# Patient Record
Sex: MALE | Race: WHITE | Employment: STUDENT | URBAN - METROPOLITAN AREA
[De-identification: names, ages, dates, MRNs, and addresses within clinical notes are randomized per-mention and may not be internally consistent; named-entity substitution may affect disease eponyms.]

---

## 2017-09-01 ENCOUNTER — OFFICE VISIT (OUTPATIENT)
Dept: FAMILY MEDICINE | Facility: CLINIC | Age: 27
End: 2017-09-01
Payer: COMMERCIAL

## 2017-09-01 ENCOUNTER — TRANSFERRED RECORDS (OUTPATIENT)
Dept: HEALTH INFORMATION MANAGEMENT | Facility: CLINIC | Age: 27
End: 2017-09-01

## 2017-09-01 VITALS
BODY MASS INDEX: 23.57 KG/M2 | RESPIRATION RATE: 22 BRPM | WEIGHT: 174 LBS | SYSTOLIC BLOOD PRESSURE: 120 MMHG | HEIGHT: 72 IN | DIASTOLIC BLOOD PRESSURE: 70 MMHG | OXYGEN SATURATION: 99 % | TEMPERATURE: 96.5 F | HEART RATE: 82 BPM

## 2017-09-01 DIAGNOSIS — N50.819 TESTICLE PAIN: ICD-10-CM

## 2017-09-01 DIAGNOSIS — Z00.00 ROUTINE GENERAL MEDICAL EXAMINATION AT A HEALTH CARE FACILITY: Primary | ICD-10-CM

## 2017-09-01 DIAGNOSIS — J30.1 ACUTE SEASONAL ALLERGIC RHINITIS DUE TO POLLEN: ICD-10-CM

## 2017-09-01 DIAGNOSIS — S59.901A ELBOW INJURY, RIGHT, INITIAL ENCOUNTER: ICD-10-CM

## 2017-09-01 PROCEDURE — 99395 PREV VISIT EST AGE 18-39: CPT | Performed by: FAMILY MEDICINE

## 2017-09-01 PROCEDURE — 99214 OFFICE O/P EST MOD 30 MIN: CPT | Mod: 25 | Performed by: FAMILY MEDICINE

## 2017-09-01 NOTE — MR AVS SNAPSHOT
After Visit Summary   9/1/2017    Bereket Croft    MRN: [id number]           Patient Information     Date Of Birth          1990        Visit Information        Provider Department      9/1/2017 8:00 AM Martha Wyman MD [facility]        Today's Diagnoses     Routine general medical examination at a health care facility    -  1    Testicle pain- Lt late 8-17        Acute seasonal allergic rhinitis due to pollen          Care Instructions    1. Please do your breast exam every mo, when you  Change the  calendar page or set an alarm on your cell phone Do a  visual check for dimples, inversion or indentation or any different position of the nipple Feel manually  for any 1cm or larger  size mass ie about the size of an almond Be sure to cover the entire area of both breasts : this extends back to the back on either side and from the collar bone to the bottom of the breasts where you can begin to feel ribs.  And check the testicles for cancer also then     2. Boil water and breath the warm vapors 2-3 times a day to try to open up the sinuses. Run a steamer or cold air vaporizer as much as possible. Take Mucinex plain blue  1200 mg (This may come as 600mg/tablet and you need to take 2 tabs twice a day) twice a day. Mucinex is guaifenesin, the major component of most cough syrups, because it makes the mucus less thick, and therefore it drains out better and you are less likely to cough from it dripping on the back of your throat.  Irrigate the  nose with plain water under the kitchen sink faucet or the shower.  Luma pots, spray bottles, etc accumulate bacteria and are not recommended.   The tickle in the throat is also helped by gargling with vinegar and honey mixture, or pop or mouth wash as these coat the throat.  Please try to rinse teeth with water after using these .     3.  Consider antihistamines= antiallergens:fro least to most powerful and  from least to most drowsy :   Loratadine, clariten, alavert        Loratadine, clariten, alavert 10mgm a day        Fexofenadine= allegra 180mgm a day                                                                                                                                                       Cetirizine=zyrtec  10mgm a day###        Benadryl=diphenhydramine  25-50 mgm- only at bedtime-can be used together with one of the above taken during the day    4. If still trouble, go to [facility]     5. Do the testicle US  At [facility]   [phone]  At  [address] between [facility] & [facility]    6. No difference was  Noted by patients in a double blind study when given codeine, tylenol ( acetaminophen) or ibuprofen (all in identical pills). They felt no difference in pain relief. Since ibuprofen and the NSAIDs  causes kidney damage, esophageal damage with heartburn, and can increase the risk of esophageal and stomach cancer and ulcers,and colonic strictures. They also cause increased risk of heart attack .   I recommend that you use tylenol(acetaminophen) for pain. Use the acetaminophen ES  Which has 500mgm/tablet You can take up to 2 tablets 4 times a day as need for pain.  If this is not enough, you can add in ibuprofen or aleve(naprosyn) with 2 glasses of fluid and some food-to protect the stomach and esophagus. Please let us know if you are continuing to take ibuprofen or aleve, as we will need to periodically check your kidney function with a blood test.            Follow-ups after your visit        Future tests that were ordered for you today     Open Future Orders        Priority Expected Expires Ordered    US Testicular & Scrotum w Doppler Ltd Routine  9/1/2018 9/1/2017            Who to contact     If you have questions or need follow up information about today's clinic visit or your schedule please contact [facility] directly at  "[phone].  Normal or non-critical lab and imaging results will be communicated to you by MyChart, letter or phone within 4 business days after the clinic has received the results. If you do not hear from us within 7 days, please contact the clinic through [employer] or phone. If you have a critical or abnormal lab result, we will notify you by phone as soon as possible.  Submit refill requests through [employer] or call your pharmacy and they will forward the refill request to us. Please allow 3 business days for your refill to be completed.          Additional Information About Your Visit        [employer] Information     [employer] lets you send messages to your doctor, view your test results, renew your prescriptions, schedule appointments and more. To sign up, go to [email or web address] . Click on \"Log in\" on the left side of the screen, which will take you to the Welcome page. Then click on \"Sign up Now\" on the right side of the page.     You will be asked to enter the access code listed below, as well as some personal information. Please follow the directions to create your username and password.     Your access code is: [email or web address]  Expires: 2017  8:42 AM     Your access code will  in 90 days. If you need help or a new code, please call your [place] clinic or [phone].        Care EveryWhere ID     This is your Care EveryWhere ID. This could be used by other organizations to access your [place] medical records  [id number]        Your Vitals Were     Pulse Temperature Respirations Height Pulse Oximetry BMI (Body Mass Index)    82 96.5  F (35.8  C) (Tympanic) 22 5' 11.5\" (1.816 m) 99% 23.93 kg/m2       Blood Pressure from Last 3 Encounters:   17 120/70   05/28/10 118/64   02/06/10 114/74    Weight from Last 3 Encounters:   17 174 lb (78.9 kg)   05/28/10 183 lb (83 kg)   01/29/10 180 lb (81.6 kg)               Primary Care Provider Office Phone # Fax #    Va Smith MD " [phone]       [address] [address]        Equal Access to Services     VALDO FU : Hadii aad ku hadazucenakatina Burgos, flexda estelajaspalha, pola kabennyda karo, kimberly fisher laKeenamike addison. So [facility] [phone].    ATENCIÓN: Si habla español, tiene a mobley disposición servicios gratuitos de asistencia lingüística. Llame al [phone].    We comply with applicable federal civil rights laws and [place] laws. We do not discriminate on the basis of race, color, national origin, age, disability sex, sexual orientation or gender identity.            Thank you!     Thank you for choosing [facility]  for your care. Our goal is always to provide you with excellent care. Hearing back from our patients is one way we can continue to improve our services. Please take a few minutes to complete the written survey that you may receive in the mail after your visit with us. Thank you!             Your Updated Medication List - Protect others around you: Learn how to safely use, store and throw away your medicines at www.disposemymeds.org.          This list is accurate as of: 9/1/17  8:42 AM.  Always use your most recent med list.                   Brand Name Dispense Instructions for use Diagnosis    FLONASE 50 MCG/ACT spray   Generic drug:  fluticasone     1 Bottle    2 sprays to each nostril once daily    Acute maxillary sinusitis

## 2017-09-01 NOTE — NURSING NOTE
"Chief Complaint   Patient presents with     Physical     /70  Pulse 82  Temp 96.5  F (35.8  C) (Tympanic)  Resp 22  Ht 5' 11.5\" (1.816 m)  Wt 174 lb (78.9 kg)  SpO2 99%  BMI 23.93 kg/m2 Estimated body mass index is 23.93 kg/(m^2) as calculated from the following:    Height as of this encounter: 5' 11.5\" (1.816 m).    Weight as of this encounter: 174 lb (78.9 kg).  BP completed using cuff size: regular   Nighat Flanagan CMA    Health Maintenance Due   Topic Date Due     INFLUENZA VACCINE (SYSTEM ASSIGNED)  09/01/2017     Health Maintenance reviewed at today's visit patient asked to schedule/complete:   Immunizations:  Patient agrees to schedule    "

## 2017-09-01 NOTE — PROGRESS NOTES
SUBJECTIVE:   CC: Bereket Croft is an 26 year old male who presents for preventative health visit.     Physical   Annual:     Getting at least 3 servings of Calcium per day::  Yes    Bi-annual eye exam::  NO    Dental care twice a year::  Yes    Sleep apnea or symptoms of sleep apnea::  Daytime drowsiness and Sleep apnea    Diet::  Breakfast skipped    Frequency of exercise::  2-3 days/week    Duration of exercise::  15-30 minutes    Taking medications regularly::  Not Applicable    Additional concerns today::  YES    Allergies    Lt Testicular Pain      Duration: onset and worst on  8-24-17 and since slowly resolving       Never severe enough he could not work etc     Description (location/character/radiation): above     Intensity:  moderate    Accompanying signs and symptoms: above and tender     History (similar episodes/previous evaluation): None    Precipitating or alleviating factors: unknown -no known injury    Therapies tried and outcome: None     ALLERGIES      Duration: since childhood     Description:   Nasal congestion: YES  Sneezing: YES  Red, itchy eyes: YES    Accompanying signs and symptoms: HA , fatigrue    History (similar episodes/allergy testing): None    Precipitating or alleviating factors: seasxonal     Therapies tried and outcome: some otc antihistamines      Musculoskeletal problem/pain: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       Duration: 6-16 -fell on it     Description  Location: above     Intensity:  mild, occurs when leans o n it     Accompanying signs and symptoms: none    History  Previous similar problem: no   Previous evaluation:  none    Precipitating or alleviating factors:  Trauma or overuse: YES  Aggravating factors include: wt on iit    Therapies tried and outcome: nothing       Today's PHQ-2 Score: PHQ-2 ( 1999 Pfizer) 9/1/2017   Q1: Little interest or pleasure in doing things 0   Q2: Feeling down, depressed or hopeless 0   PHQ-2 Score 0   Q1: Little  interest or pleasure in doing things Not at all   Q2: Feeling down, depressed or hopeless Not at all   PHQ-2 Score 0       Abuse: Current or Past(Physical, Sexual or Emotional)- No  Do you feel safe in your environment - Yes    Social History   Substance Use Topics     Smoking status: Never Smoker     Smokeless tobacco: Former User     Alcohol use 1.0 oz/week     2 Standard drinks or equivalent per week      Comment: rare     The patient does not drink >3 drinks per day nor >7 drinks per week.    Last PSA: No results found for: PSA    Reviewed orders with patient. Reviewed health maintenance and updated orders accordingly - Yes  Labs reviewed in EPIC    Reviewed and updated as needed this visit by clinical staff  Tobacco  Allergies  Meds  Problems  Med Hx  Surg Hx  Fam Hx  Soc Hx          Reviewed and updated as needed this visit by Provider              ROS:C: NEGATIVE for fever, chills, change in weight  I: NEGATIVE for worrisome rashes, moles or lesions  E: NEGATIVE for vision changes or irritation  ENT: NEGATIVE for ear, mouth and throat problems  R: NEGATIVE for significant cough or SOB  CV: NEGATIVE for chest pain, palpitations or peripheral edema  GI: NEGATIVE for nausea, abdominal pain, heartburn, or change in bowel habits   male: negative for dysuria, hematuria, decreased urinary stream, erectile dysfunction, urethral discharge-Lt testicle pain last wk   M: NEGATIVE for significant arthralgias or myalgia  N: NEGATIVE for weakness, dizziness or paresthesias  P: NEGATIVE for changes in mood or affect    OBJECTIVE:   There were no vitals taken for this visit.    EXAM:  GENERAL: healthy, alert and no distress  EYES: Eyes grossly normal to inspection, PERRL and conjunctivae and sclerae normal  HENT: ear canals and TM's normal, nose and mouth without ulcers or lesions  NECK: no adenopathy, no asymmetry, masses, or scars and thyroid normal to palpation  RESP: lungs clear to auscultation - no rales,  "rhonchi or wheezes  CV: regular rate and rhythm, normal S1 S2, no S3 or S4, no murmur, click or rub, no peripheral edema and peripheral pulses strong  ABDOMEN: soft, nontender, no hepatosplenomegaly, no masses and bowel sounds normal   (male): normal male genitalia without lesions or urethral discharge, no hernia  MS: no gross musculoskeletal defects noted, no edema  SKIN: no suspicious lesions or rashes  NEURO: Normal strength and tone, mentation intact and speech normal  PSYCH: mentation appears normal, affect normal/bright  LYMPH: no cervical, supraclavicular, axillary, or inguinal adenopathy    ASSESSMENT/PLAN:       ICD-10-CM    1. Routine general medical examination at a health care facility Z00.00    2. Testicle pain- Lt late 8-17 N50.819 US Testicular & Scrotum w Doppler Ltd     OFFICE/OUTPT VISIT,EST,LEVL IV   3. Acute seasonal allergic rhinitis due to pollen J30.1 OFFICE/OUTPT VISIT,EST,LEVL IV       COUNSELING:   Reviewed preventive health counseling, as reflected in patient instructions       Regular exercise       Healthy diet/nutrition         reports that he has never smoked. He has quit using smokeless tobacco.      Estimated body mass index is 25.17 kg/(m^2) as calculated from the following:    Height as of 5/28/10: 5' 11.5\" (1.816 m).    Weight as of 5/28/10: 183 lb (83 kg).         Counseling Resources:  ATP IV Guidelines  Pooled Cohorts Equation Calculator  FRAX Risk Assessment  ICSI Preventive Guidelines  Dietary Guidelines for Americans, 2010  USDA's MyPlate  ASA Prophylaxis  Lung CA ScreeningPatient Instructions   1. Please do your breast exam every mo, when you  Change the  calendar page or set an alarm on your cell phone Do a  visual check for dimples, inversion or indentation or any different position of the nipple Feel manually  for any 1cm or larger  size mass ie about the size of an almond Be sure to cover the entire area of both breasts : this extends back to the back on either side " and from the collar bone to the bottom of the breasts where you can begin to feel ribs.  And check the testicles for cancer also then     2. Boil water and breath the warm vapors 2-3 times a day to try to open up the sinuses. Run a steamer or cold air vaporizer as much as possible. Take Mucinex plain blue  1200 mg (This may come as 600mg/tablet and you need to take 2 tabs twice a day) twice a day. Mucinex is guaifenesin, the major component of most cough syrups, because it makes the mucus less thick, and therefore it drains out better and you are less likely to cough from it dripping on the back of your throat.  Irrigate the  nose with plain water under the kitchen sink faucet or the shower.  Luma pots, spray bottles, etc accumulate bacteria and are not recommended.   The tickle in the throat is also helped by gargling with vinegar and honey mixture, or pop or mouth wash as these coat the throat.  Please try to rinse teeth with water after using these .     3.  Consider antihistamines= antiallergens:fro least to most powerful and from least to most drowsy :   Loratadine, clariten, alavert        Loratadine, clariten, alavert 10mgm a day        Fexofenadine= allegra 180mgm a day                                                                                                                                                       Cetirizine=zyrtec  10mgm a day###        Benadryl=diphenhydramine  25-50 mgm- only at bedtime-can be used together with one of the above taken during the day    4. If still trouble, go to [facility]     5. Do the testicle US  At [facility]   [phone]  At  [address] between [facility] & [facility]    6. No difference was  Noted by patients in a double blind study when given codeine, tylenol ( acetaminophen) or ibuprofen (all in identical pills). They felt no difference in pain relief. Since ibuprofen and the NSAIDs  causes kidney damage, esophageal damage  with heartburn, and can increase the risk of esophageal and stomach cancer and ulcers,and colonic strictures. They also cause increased risk of heart attack .   I recommend that you use tylenol(acetaminophen) for pain. Use the acetaminophen ES  Which has 500mgm/tablet You can take up to 2 tablets 4 times a day as need for pain.  If this is not enough, you can add in ibuprofen or aleve(naprosyn) with 2 glasses of fluid and some food-to protect the stomach and esophagus. Please let us know if you are continuing to take ibuprofen or aleve, as we will need to periodically check your kidney function with a blood test.      Time spent with the patient 26mins, more than 50% in counseling and coordinating care, Re above medical problems.:  1. The Lt testicle pain     As it was  moderata a wk ago, doubt torsion  More likely an unknown contusion injury causing intra organ clot/heme with pain   Is slowly resolving and   Must r/o ca  So __ US and urol refer     2. Allergies: evaluated and recommended treatment     3. Reassured pt that the elbow neuroma Is a common result of direct trauma and may resolve slowly      Martha Wyman MD  [facility]  Answers for HPI/ROS submitted by the patient on 9/1/2017   PHQ-2 Score: 0

## 2017-09-06 ENCOUNTER — TELEPHONE (OUTPATIENT)
Dept: FAMILY MEDICINE | Facility: CLINIC | Age: 27
End: 2017-09-06

## 2018-03-11 ENCOUNTER — OFFICE VISIT (OUTPATIENT)
Dept: URGENT CARE | Facility: URGENT CARE | Age: 28
End: 2018-03-11
Payer: COMMERCIAL

## 2018-03-11 VITALS
OXYGEN SATURATION: 100 % | BODY MASS INDEX: 23.6 KG/M2 | DIASTOLIC BLOOD PRESSURE: 82 MMHG | RESPIRATION RATE: 18 BRPM | WEIGHT: 174.2 LBS | HEIGHT: 72 IN | HEART RATE: 76 BPM | TEMPERATURE: 98.1 F | SYSTOLIC BLOOD PRESSURE: 110 MMHG

## 2018-03-11 DIAGNOSIS — R07.0 THROAT PAIN: ICD-10-CM

## 2018-03-11 DIAGNOSIS — J03.90 SUPPURATIVE TONSILLITIS: Primary | ICD-10-CM

## 2018-03-11 LAB
DEPRECATED S PYO AG THROAT QL EIA: NORMAL
SPECIMEN SOURCE: NORMAL

## 2018-03-11 PROCEDURE — 99213 OFFICE O/P EST LOW 20 MIN: CPT | Performed by: PHYSICIAN ASSISTANT

## 2018-03-11 PROCEDURE — 87081 CULTURE SCREEN ONLY: CPT | Performed by: PHYSICIAN ASSISTANT

## 2018-03-11 PROCEDURE — 87880 STREP A ASSAY W/OPTIC: CPT | Performed by: PHYSICIAN ASSISTANT

## 2018-03-11 RX ORDER — CEFDINIR 300 MG/1
300 CAPSULE ORAL 2 TIMES DAILY
Qty: 20 CAPSULE | Refills: 0 | Status: SHIPPED | OUTPATIENT
Start: 2018-03-11

## 2018-03-11 NOTE — MR AVS SNAPSHOT
"              After Visit Summary   3/11/2018    Bereket Croft    MRN: [id number]           Patient Information     Date Of Birth          1990        Visit Information        Provider Department      3/11/2018 6:15 PM Amira Eli PA-C [facility]        Today's Diagnoses     Suppurative tonsillitis    -  1    Throat pain          Care Instructions    (J03.90) Suppurative tonsillitis  (primary encounter diagnosis)  Comment:   Plan: cefdinir (OMNICEF) 300 MG capsule        Salt water gargles  Ibuprofen as needed for pain    Considered contagious for first 24 hours or antibiotic  Toss toothbrush when finished with antibiotic    (R07.0) Throat pain  Comment:   Plan: Rapid strep screen, Beta strep group A culture                      Follow-ups after your visit        Who to contact     If you have questions or need follow up information about today's clinic visit or your schedule please contact [facility] directly at [phone].  Normal or non-critical lab and imaging results will be communicated to you by [employer]ut[employer]hart, letter or phone within 4 business days after the clinic has received the results. If you do not hear from us within 7 days, please contact the clinic through [employer] or phone. If you have a critical or abnormal lab result, we will notify you by phone as soon as possible.  Submit refill requests through "[employer] or call your pharmacy and they will forward the refill request to us. Please allow 3 business days for your refill to be completed.          Additional Information About Your Visit        [employer]ch[employer]ut[employer]CH+ Information     "[employer] lets you send messages to your doctor, view your test results, renew your prescriptions, schedule appointments and more. To sign up, go to [email or web address] . Click on \"Log in\" on the left side of the screen, which will take you to the Welcome page. Then click on \"Sign up Now\" on the right " side of the page.     You will be asked to enter the access code listed below, as well as some personal information. Please follow the directions to create your username and password.     Your access code is: [email or web address]  Expires: 2018  6:58 PM     Your access code will  in 90 days. If you need help or a new code, please call your [place] clinic or [phone].        Care EveryWhere ID     This is your Care EveryWhere ID. This could be used by other organizations to access your [place] medical records  [id number]        Your Vitals Were     Pulse Temperature Respirations Height Pulse Oximetry BMI (Body Mass Index)    76 98.1  F (36.7  C) (Oral) 18 6' (1.829 m) 100% 23.63 kg/m2       Blood Pressure from Last 3 Encounters:   18 110/82   17 120/70   05/28/10 118/64    Weight from Last 3 Encounters:   18 174 lb 3.2 oz (79 kg)   17 174 lb (78.9 kg)   05/28/10 183 lb (83 kg)              We Performed the Following     Beta strep group A culture     Rapid strep screen          Today's Medication Changes          These changes are accurate as of 3/11/18  6:58 PM.  If you have any questions, ask your nurse or doctor.               Start taking these medicines.        Dose/Directions    cefdinir 300 MG capsule   Commonly known as:  OMNICEF   Used for:  Suppurative tonsillitis   Started by:  Amira Eli, PA-C        Dose:  300 mg   Take 1 capsule (300 mg) by mouth 2 times daily   Quantity:  20 capsule   Refills:  0            Where to get your medicines      These medications were sent to [facility] [address] [place] [address] - [address] AT [facility] & [address]  [address], [facility] [address]     Phone:  [phone]     cefdinir 300 MG capsule                Primary Care Provider Fax #    Physician No Ref-Primary [phone]       No address on file        Equal Access to Services     VALDO FU AH: Ashley arellano  Loretta, emilie lucalebadaha, pola kajose alberto huddleston, kimberly pritchard keeshaheather ladarioashok cyn. So [facility] [phone].    ATENCIÓN: Si jessicala anjel, tiene a mobley disposición servicios gratuitos de asistencia lingüística. Jake al [phone].    We comply with applicable federal civil rights laws and [place] laws. We do not discriminate on the basis of race, color, national origin, age, disability, sex, sexual orientation, or gender identity.            Thank you!     Thank you for choosing [facility]  for your care. Our goal is always to provide you with excellent care. Hearing back from our patients is one way we can continue to improve our services. Please take a few minutes to complete the written survey that you may receive in the mail after your visit with us. Thank you!             Your Updated Medication List - Protect others around you: Learn how to safely use, store and throw away your medicines at www.disposemymeds.org.          This list is accurate as of 3/11/18  6:58 PM.  Always use your most recent med list.                   Brand Name Dispense Instructions for use Diagnosis    cefdinir 300 MG capsule    OMNICEF    20 capsule    Take 1 capsule (300 mg) by mouth 2 times daily    Suppurative tonsillitis       FLONASE 50 MCG/ACT spray   Generic drug:  fluticasone     1 Bottle    2 sprays to each nostril once daily    Acute maxillary sinusitis

## 2018-03-11 NOTE — PATIENT INSTRUCTIONS
(J03.90) Suppurative tonsillitis  (primary encounter diagnosis)  Comment:   Plan: cefdinir (OMNICEF) 300 MG capsule        Salt water gargles  Ibuprofen as needed for pain    Considered contagious for first 24 hours or antibiotic  Toss toothbrush when finished with antibiotic    (R07.0) Throat pain  Comment:   Plan: Rapid strep screen, Beta strep group A culture

## 2018-03-11 NOTE — PROGRESS NOTES
SUBJECTIVE:   Breeket Croft is a 27 year old male presenting with a chief complaint of   1) sore throat  2) body aches for the past 3-4 days  3) slight cough  4) malaise.  Onset of symptoms was as above.  Course of illness is worsening.    Severity moderate  Current and Associated symptoms: as above  Treatment measures tried include otc meds.  Predisposing factors include no flu vaccination this season.    Past Medical History:   Diagnosis Date     NO ACTIVE PROBLEMS      Patient Active Problem List   Diagnosis     NO ACTIVE PROBLEMS     Acne     Laceration     Elbow injury, right, 6-16 initial encounter- w ongoing tenderness from neuroma      Acute seasonal allergic rhinitis due to pollen     Testicle pain- Lt late 8-17     Social History   Substance Use Topics     Smoking status: Never Smoker     Smokeless tobacco: Former User     Alcohol use 1.0 oz/week     2 Standard drinks or equivalent per week      Comment: rare       ROS:  CONSTITUTIONAL:as per HPI  INTEGUMENTARY/SKIN: NEGATIVE for worrisome rashes, moles or lesions  EYES: NEGATIVE for vision changes or irritation  ENT/MOUTH: as per HPI  RESP:as per HPi  CV: NEGATIVE for chest pain, palpitations or peripheral edema  GI: NEGATIVE for nausea, abdominal pain, heartburn, or change in bowel habits  MUSCULOSKELETAL: as per HPI  NEURO: NEGATIVE for weakness, dizziness or paresthesias  Review of systems negative except as stated above.    OBJECTIVE  :/82  Pulse 76  Temp 98.1  F (36.7  C) (Oral)  Resp 18  Ht 6' (1.829 m)  Wt 174 lb 3.2 oz (79 kg)  SpO2 100%  BMI 23.63 kg/m2  GENERAL APPEARANCE: healthy, alert and no distress  EYES: EOMI,  PERRL, conjunctiva clear  HENT: ear canals and TM's normal.  Nose and mouth without ulcers, erythema or lesions  HENT: tonsillar hypertrophy, tonsillar erythema and tonsillar exudate  NECK: supple,with tender bilateral anterior cervical adenopathy  RESP: lungs clear to auscultation - no rales, rhonchi or  wheezes  CV: regular rates and rhythm, normal S1 S2, no murmur noted  ABDOMEN:  soft, nontender, no HSM or masses and bowel sounds normal  NEURO: Normal strength and tone, sensory exam grossly normal,  normal speech and mentation  SKIN: no suspicious lesions or rashes    (J03.90) Suppurative tonsillitis  (primary encounter diagnosis)  Comment:   Plan: cefdinir (OMNICEF) 300 MG capsule        Salt water gargles  Ibuprofen as needed for pain    Considered contagious for first 24 hours or antibiotic  Toss toothbrush when finished with antibiotic    (R07.0) Throat pain  Comment:   Plan: Rapid strep screen, Beta strep group A culture            Patient expresses understanding and agreement with the assessment and plan as above.

## 2018-03-12 LAB
BACTERIA SPEC CULT: NORMAL
SPECIMEN SOURCE: NORMAL

## 2019-09-30 ENCOUNTER — HEALTH MAINTENANCE LETTER (OUTPATIENT)
Age: 29
End: 2019-09-30

## 2021-01-15 ENCOUNTER — HEALTH MAINTENANCE LETTER (OUTPATIENT)
Age: 31
End: 2021-01-15

## 2021-10-24 ENCOUNTER — HEALTH MAINTENANCE LETTER (OUTPATIENT)
Age: 31
End: 2021-10-24

## 2022-02-13 ENCOUNTER — HEALTH MAINTENANCE LETTER (OUTPATIENT)
Age: 32
End: 2022-02-13

## 2022-10-15 ENCOUNTER — HEALTH MAINTENANCE LETTER (OUTPATIENT)
Age: 32
End: 2022-10-15

## 2023-03-26 ENCOUNTER — HEALTH MAINTENANCE LETTER (OUTPATIENT)
Age: 33
End: 2023-03-26